# Patient Record
Sex: FEMALE | URBAN - METROPOLITAN AREA
[De-identification: names, ages, dates, MRNs, and addresses within clinical notes are randomized per-mention and may not be internally consistent; named-entity substitution may affect disease eponyms.]

---

## 2024-05-31 ENCOUNTER — APPOINTMENT (RX ONLY)
Dept: URBAN - METROPOLITAN AREA CLINIC 125 | Facility: CLINIC | Age: 44
Setting detail: DERMATOLOGY
End: 2024-05-31

## 2024-05-31 DIAGNOSIS — R21 RASH AND OTHER NONSPECIFIC SKIN ERUPTION: ICD-10-CM | Status: RESOLVED

## 2024-05-31 PROCEDURE — ? TREATMENT REGIMEN

## 2024-05-31 PROCEDURE — ? COUNSELING

## 2024-05-31 PROCEDURE — ? ORDER TESTS

## 2024-05-31 PROCEDURE — 99203 OFFICE O/P NEW LOW 30 MIN: CPT

## 2024-05-31 ASSESSMENT — LOCATION DETAILED DESCRIPTION DERM: LOCATION DETAILED: LEFT INFERIOR MEDIAL MALAR CHEEK

## 2024-05-31 ASSESSMENT — SEVERITY ASSESSMENT: SEVERITY: CLEAR

## 2024-05-31 ASSESSMENT — LOCATION ZONE DERM: LOCATION ZONE: FACE

## 2024-05-31 ASSESSMENT — LOCATION SIMPLE DESCRIPTION DERM: LOCATION SIMPLE: LEFT CHEEK

## 2024-05-31 NOTE — HPI: RASH (ECZEMA)
How Severe Is Your Eczema?: mild
Is This A New Presentation, Or A Follow-Up?: Rash
Additional History: Patients PCP prescribed oral prednisone taper and symptoms resolved.  Redness and erythema returned after d/c and she used topical hydrocortisone with improved symptoms.  She then developed sty and infection on left eye and optometry prescribed oral antibiotics and all symptoms resolved and have not returned.\\nShe is concerned symptoms may return.  She denies new medication, foods or grooming products.  No recent illness or constitutional symptoms

## 2024-05-31 NOTE — PROCEDURE: TREATMENT REGIMEN
Detail Level: Zone
Plan: Rash not present at today’s visit.  She will RTC same day if flares.  Discussed possible causes.  Check EKATERINA with facial erythema.  Further treatment recommendations pending results.   Monitor for flares

## 2024-05-31 NOTE — PROCEDURE: ORDER TESTS
Performing Laboratory: 0
Billing Type: Third-Party Bill
Expected Date Of Service: 05/31/2024
Bill For Surgical Tray: no

## 2024-05-31 NOTE — PROCEDURE: COUNSELING
Detail Level: Detailed
Patient Specific Counseling (Will Not Stick From Patient To Patient): Rash not present at today’s visit. Discussed no new products and meds. Patient states she has stopped all new face products. Discussed seasonal allergies.

## 2024-05-31 NOTE — PROCEDURE: MIPS QUALITY
Detail Level: Detailed
Quality 130: Documentation Of Current Medications In The Medical Record: Current Medications Documented
Quality 394a: Meningococcal Immunizations For Adolescents: Patient had anaphylaxis due to the meningococcal vaccine any time on or before the patient’s 15th birthday
Quality 431: Preventive Care And Screening: Unhealthy Alcohol Use - Screening: Patient not identified as an unhealthy alcohol user when screened for unhealthy alcohol use using a systematic screening method
Quality 226: Preventive Care And Screening: Tobacco Use: Screening And Cessation Intervention: Patient screened for tobacco use, is a smoker AND received Cessation Counseling within measurement period or in the six months prior to the measurement period